# Patient Record
Sex: FEMALE | Race: OTHER | HISPANIC OR LATINO | Employment: UNEMPLOYED | URBAN - METROPOLITAN AREA
[De-identification: names, ages, dates, MRNs, and addresses within clinical notes are randomized per-mention and may not be internally consistent; named-entity substitution may affect disease eponyms.]

---

## 2022-01-05 ENCOUNTER — GYNECOLOGY VISIT (OUTPATIENT)
Dept: OBGYN | Facility: CLINIC | Age: 22
End: 2022-01-05

## 2022-01-05 VITALS
BODY MASS INDEX: 27.08 KG/M2 | DIASTOLIC BLOOD PRESSURE: 68 MMHG | WEIGHT: 129 LBS | HEIGHT: 58 IN | SYSTOLIC BLOOD PRESSURE: 100 MMHG

## 2022-01-05 DIAGNOSIS — N93.8 DUB (DYSFUNCTIONAL UTERINE BLEEDING): ICD-10-CM

## 2022-01-05 PROCEDURE — 99203 OFFICE O/P NEW LOW 30 MIN: CPT | Mod: 25 | Performed by: ADVANCED PRACTICE MIDWIFE

## 2022-01-05 PROCEDURE — 76857 US EXAM PELVIC LIMITED: CPT | Performed by: ADVANCED PRACTICE MIDWIFE

## 2022-01-05 NOTE — PROGRESS NOTES
"Tisha Morton is a 21 y.o. female who presents for DUB        HPI Comments: Pt presents for DUB.  No LMP recorded. She reports that she was traveling from [facility] around first week of August and had approximately 3 days of bleeding which was different than her normal period. She believes prior to this she had a period in May of 2021.     Review of Systems   Pertinent positives documented in HPI and all other systems reviewed & are negative      History reviewed. No pertinent past medical history.    Medications:   No current [facility]-ordered outpatient medications on file.     No current [facility]-ordered facility-administered medications on file.          Objective:   Vital measurements:  /68 (BP Location: Left arm, Patient Position: Sitting, BP Cuff Size: Adult)   Ht 1.461 m (4' 9.5\")   Wt 58.5 kg (129 lb)   Body mass index is 27.43 kg/m². (Goal BM I>18 <25)    Physical Exam   Nursing note and vitals reviewed.  Constitutional: She is oriented to person, place, and time. She appears well-developed and well-nourished. No distress.     Genitourinary:  Uterus size of 32   No vaginal bleeding or discharge noted.     Musculoskeletal: Normal range of motion. She exhibits no edema and no tenderness.     Skin: Skin is warm and dry. No rash noted. She is not diaphoretic. No erythema. No pallor.     Psychiatric: She has a normal mood and affect. Her behavior is normal. Judgment and thought content normal.     Transabdominal Ultrasound  Indication: unsure LMP    Findings:  BPD 8.66 8.40 8.39   HC 30.12 32.25 31.78  AC 29.89 28.96 29.19  FL 6.44 6.25 6.07    FHR: 138 bpm  Grade II placenta  Fetus in vertex position    Impression: Single intrauterine pregnancy measuring 33 weeks and 5 days.     Per ACOG dating guidelines, final AMAN is 02/18/2022 based on US today due to significant discrepancy with LMP    Ultrasound performed and read by myself.              Assessment:     1. DUB (dysfunctional uterine bleeding)   "       Plan:   1. Discussed importance of prenatal vitamins with patient. Encouraged daily use.  2. Discussed advanced gestational age with patient and she voices understanding. Will get in ASAP for financial appointment.   3. Follow up in 1 weeks for NOB visit.

## 2022-01-05 NOTE — NON-PROVIDER
Patient here for GYN/DUB.  UPT=  LMP=8/15/21. approximated  AMAN=5/22/22  GA=20w3d  Last pap: n/a  Phone number: [phone]/ Baylee (friend) [phone]  Pharmacy verified  No problems today

## 2022-01-11 ENCOUNTER — APPOINTMENT (OUTPATIENT)
Dept: OBGYN | Facility: CLINIC | Age: 22
End: 2022-01-11
Payer: MEDICAID

## 2022-02-11 ENCOUNTER — HOSPITAL ENCOUNTER (INPATIENT)
Facility: MEDICAL CENTER | Age: 22
LOS: 2 days | End: 2022-02-13
Attending: OBSTETRICS & GYNECOLOGY | Admitting: OBSTETRICS & GYNECOLOGY
Payer: COMMERCIAL

## 2022-02-11 LAB
ABO GROUP BLD: NORMAL
AMPHET UR QL SCN: NEGATIVE
BARBITURATES UR QL SCN: NEGATIVE
BASOPHILS # BLD AUTO: 0.8 % (ref 0–1.8)
BASOPHILS # BLD: 0.07 K/UL (ref 0–0.12)
BENZODIAZ UR QL SCN: NEGATIVE
BLD GP AB SCN SERPL QL: NORMAL
BZE UR QL SCN: NEGATIVE
CANNABINOIDS UR QL SCN: NEGATIVE
EOSINOPHIL # BLD AUTO: 1.03 K/UL (ref 0–0.51)
EOSINOPHIL NFR BLD: 11.7 % (ref 0–6.9)
ERYTHROCYTE [DISTWIDTH] IN BLOOD BY AUTOMATED COUNT: 42.1 FL (ref 35.9–50)
HBV SURFACE AG SER QL: ABNORMAL
HCT VFR BLD AUTO: 36.7 % (ref 37–47)
HCV AB SER QL: ABNORMAL
HGB BLD-MCNC: 12.1 G/DL (ref 12–16)
HIV 1+2 AB+HIV1 P24 AG SERPL QL IA: NORMAL
IMM GRANULOCYTES # BLD AUTO: 0.04 K/UL (ref 0–0.11)
IMM GRANULOCYTES NFR BLD AUTO: 0.5 % (ref 0–0.9)
LYMPHOCYTES # BLD AUTO: 1.94 K/UL (ref 1–4.8)
LYMPHOCYTES NFR BLD: 21.9 % (ref 22–41)
MCH RBC QN AUTO: 28.3 PG (ref 27–33)
MCHC RBC AUTO-ENTMCNC: 33 G/DL (ref 33.6–35)
MCV RBC AUTO: 85.9 FL (ref 81.4–97.8)
METHADONE UR QL SCN: NEGATIVE
MONOCYTES # BLD AUTO: 0.56 K/UL (ref 0–0.85)
MONOCYTES NFR BLD AUTO: 6.3 % (ref 0–13.4)
NEUTROPHILS # BLD AUTO: 5.2 K/UL (ref 2–7.15)
NEUTROPHILS NFR BLD: 58.8 % (ref 44–72)
NRBC # BLD AUTO: 0 K/UL
NRBC BLD-RTO: 0 /100 WBC
OPIATES UR QL SCN: NEGATIVE
OXYCODONE UR QL SCN: NEGATIVE
PCP UR QL SCN: NEGATIVE
PLATELET # BLD AUTO: 164 K/UL (ref 164–446)
PMV BLD AUTO: 12.3 FL (ref 9–12.9)
PROPOXYPH UR QL SCN: NEGATIVE
RBC # BLD AUTO: 4.27 M/UL (ref 4.2–5.4)
RH BLD: NORMAL
RUBV AB SER QL: 220 IU/ML
SARS-COV+SARS-COV-2 AG RESP QL IA.RAPID: NOTDETECTED
SPECIMEN SOURCE: NORMAL
TREPONEMA PALLIDUM IGG+IGM AB [PRESENCE] IN SERUM OR PLASMA BY IMMUNOASSAY: ABNORMAL
WBC # BLD AUTO: 8.8 K/UL (ref 4.8–10.8)

## 2022-02-11 PROCEDURE — 86850 RBC ANTIBODY SCREEN: CPT

## 2022-02-11 PROCEDURE — 59409 OBSTETRICAL CARE: CPT | Performed by: OBSTETRICS & GYNECOLOGY

## 2022-02-11 PROCEDURE — 86762 RUBELLA ANTIBODY: CPT

## 2022-02-11 PROCEDURE — 87591 N.GONORRHOEAE DNA AMP PROB: CPT

## 2022-02-11 PROCEDURE — 86780 TREPONEMA PALLIDUM: CPT

## 2022-02-11 PROCEDURE — 700111 HCHG RX REV CODE 636 W/ 250 OVERRIDE (IP): Performed by: OBSTETRICS & GYNECOLOGY

## 2022-02-11 PROCEDURE — 87340 HEPATITIS B SURFACE AG IA: CPT

## 2022-02-11 PROCEDURE — 770002 HCHG ROOM/CARE - OB PRIVATE (112)

## 2022-02-11 PROCEDURE — 86803 HEPATITIS C AB TEST: CPT

## 2022-02-11 PROCEDURE — 36415 COLL VENOUS BLD VENIPUNCTURE: CPT

## 2022-02-11 PROCEDURE — A9270 NON-COVERED ITEM OR SERVICE: HCPCS | Performed by: OBSTETRICS & GYNECOLOGY

## 2022-02-11 PROCEDURE — 302449 STATCHG TRIAGE ONLY (STATISTIC)

## 2022-02-11 PROCEDURE — 700102 HCHG RX REV CODE 250 W/ 637 OVERRIDE(OP): Performed by: OBSTETRICS & GYNECOLOGY

## 2022-02-11 PROCEDURE — 87426 SARSCOV CORONAVIRUS AG IA: CPT

## 2022-02-11 PROCEDURE — 85025 COMPLETE CBC W/AUTO DIFF WBC: CPT

## 2022-02-11 PROCEDURE — 86901 BLOOD TYPING SEROLOGIC RH(D): CPT

## 2022-02-11 PROCEDURE — 304965 HCHG RECOVERY SERVICES

## 2022-02-11 PROCEDURE — 87389 HIV-1 AG W/HIV-1&-2 AB AG IA: CPT

## 2022-02-11 PROCEDURE — 87491 CHLMYD TRACH DNA AMP PROBE: CPT

## 2022-02-11 PROCEDURE — 87086 URINE CULTURE/COLONY COUNT: CPT

## 2022-02-11 PROCEDURE — 59409 OBSTETRICAL CARE: CPT

## 2022-02-11 PROCEDURE — 80307 DRUG TEST PRSMV CHEM ANLYZR: CPT

## 2022-02-11 PROCEDURE — 86900 BLOOD TYPING SEROLOGIC ABO: CPT

## 2022-02-11 PROCEDURE — 86592 SYPHILIS TEST NON-TREP QUAL: CPT

## 2022-02-11 RX ORDER — SODIUM CHLORIDE, SODIUM LACTATE, POTASSIUM CHLORIDE, CALCIUM CHLORIDE 600; 310; 30; 20 MG/100ML; MG/100ML; MG/100ML; MG/100ML
INJECTION, SOLUTION INTRAVENOUS PRN
Status: DISCONTINUED | OUTPATIENT
Start: 2022-02-11 | End: 2022-02-13 | Stop reason: HOSPADM

## 2022-02-11 RX ORDER — ONDANSETRON 2 MG/ML
4 INJECTION INTRAMUSCULAR; INTRAVENOUS EVERY 6 HOURS PRN
Status: DISCONTINUED | OUTPATIENT
Start: 2022-02-11 | End: 2022-02-11 | Stop reason: HOSPADM

## 2022-02-11 RX ORDER — SODIUM CHLORIDE, SODIUM LACTATE, POTASSIUM CHLORIDE, CALCIUM CHLORIDE 600; 310; 30; 20 MG/100ML; MG/100ML; MG/100ML; MG/100ML
INJECTION, SOLUTION INTRAVENOUS CONTINUOUS
Status: DISCONTINUED | OUTPATIENT
Start: 2022-02-11 | End: 2022-02-13 | Stop reason: ALTCHOICE

## 2022-02-11 RX ORDER — TERBUTALINE SULFATE 1 MG/ML
0.25 INJECTION, SOLUTION SUBCUTANEOUS
Status: DISCONTINUED | OUTPATIENT
Start: 2022-02-11 | End: 2022-02-11 | Stop reason: HOSPADM

## 2022-02-11 RX ORDER — OXYTOCIN 10 [USP'U]/ML
10 INJECTION, SOLUTION INTRAMUSCULAR; INTRAVENOUS
Status: DISCONTINUED | OUTPATIENT
Start: 2022-02-11 | End: 2022-02-11 | Stop reason: HOSPADM

## 2022-02-11 RX ORDER — ACETAMINOPHEN 500 MG
1000 TABLET ORAL
Status: DISCONTINUED | OUTPATIENT
Start: 2022-02-11 | End: 2022-02-11 | Stop reason: HOSPADM

## 2022-02-11 RX ORDER — IBUPROFEN 800 MG/1
800 TABLET ORAL EVERY 8 HOURS PRN
Status: DISCONTINUED | OUTPATIENT
Start: 2022-02-11 | End: 2022-02-13 | Stop reason: HOSPADM

## 2022-02-11 RX ORDER — ONDANSETRON 4 MG/1
4 TABLET, ORALLY DISINTEGRATING ORAL EVERY 6 HOURS PRN
Status: DISCONTINUED | OUTPATIENT
Start: 2022-02-11 | End: 2022-02-11 | Stop reason: HOSPADM

## 2022-02-11 RX ORDER — ACETAMINOPHEN 500 MG
1000 TABLET ORAL EVERY 6 HOURS PRN
Status: DISCONTINUED | OUTPATIENT
Start: 2022-02-11 | End: 2022-02-13 | Stop reason: HOSPADM

## 2022-02-11 RX ORDER — DOCUSATE SODIUM 100 MG/1
100 CAPSULE, LIQUID FILLED ORAL 2 TIMES DAILY PRN
Status: DISCONTINUED | OUTPATIENT
Start: 2022-02-11 | End: 2022-02-13 | Stop reason: HOSPADM

## 2022-02-11 RX ORDER — MISOPROSTOL 200 UG/1
800 TABLET ORAL
Status: DISCONTINUED | OUTPATIENT
Start: 2022-02-11 | End: 2022-02-11 | Stop reason: HOSPADM

## 2022-02-11 RX ORDER — IBUPROFEN 800 MG/1
800 TABLET ORAL
Status: COMPLETED | OUTPATIENT
Start: 2022-02-11 | End: 2022-02-11

## 2022-02-11 RX ADMIN — OXYTOCIN 2000 ML/HR: 10 INJECTION, SOLUTION INTRAMUSCULAR; INTRAVENOUS at 17:30

## 2022-02-11 RX ADMIN — OXYTOCIN 125 ML/HR: 10 INJECTION, SOLUTION INTRAMUSCULAR; INTRAVENOUS at 18:57

## 2022-02-11 RX ADMIN — IBUPROFEN 800 MG: 800 TABLET, FILM COATED ORAL at 18:26

## 2022-02-11 ASSESSMENT — LIFESTYLE VARIABLES
ALCOHOL_USE: NO
EVER_SMOKED: NEVER

## 2022-02-11 ASSESSMENT — PATIENT HEALTH QUESTIONNAIRE - PHQ9
SUM OF ALL RESPONSES TO PHQ9 QUESTIONS 1 AND 2: 0
1. LITTLE INTEREST OR PLEASURE IN DOING THINGS: NOT AT ALL
2. FEELING DOWN, DEPRESSED, IRRITABLE, OR HOPELESS: NOT AT ALL

## 2022-02-12 ENCOUNTER — PHARMACY VISIT (OUTPATIENT)
Dept: PHARMACY | Facility: MEDICAL CENTER | Age: 22
End: 2022-02-12
Payer: MEDICARE

## 2022-02-12 LAB
ERYTHROCYTE [DISTWIDTH] IN BLOOD BY AUTOMATED COUNT: 42.6 FL (ref 35.9–50)
HCT VFR BLD AUTO: 34.5 % (ref 37–47)
HGB BLD-MCNC: 11.3 G/DL (ref 12–16)
MCH RBC QN AUTO: 28.5 PG (ref 27–33)
MCHC RBC AUTO-ENTMCNC: 32.8 G/DL (ref 33.6–35)
MCV RBC AUTO: 86.9 FL (ref 81.4–97.8)
PLATELET # BLD AUTO: 155 K/UL (ref 164–446)
PMV BLD AUTO: 11.5 FL (ref 9–12.9)
RBC # BLD AUTO: 3.97 M/UL (ref 4.2–5.4)
WBC # BLD AUTO: 10.9 K/UL (ref 4.8–10.8)

## 2022-02-12 PROCEDURE — 85027 COMPLETE CBC AUTOMATED: CPT

## 2022-02-12 PROCEDURE — 770002 HCHG ROOM/CARE - OB PRIVATE (112)

## 2022-02-12 PROCEDURE — RXMED WILLOW AMBULATORY MEDICATION CHARGE

## 2022-02-12 PROCEDURE — 36415 COLL VENOUS BLD VENIPUNCTURE: CPT

## 2022-02-12 RX ORDER — ACETAMINOPHEN 500 MG
1000 TABLET ORAL EVERY 6 HOURS PRN
Qty: 30 TABLET | Refills: 0 | Status: SHIPPED | OUTPATIENT
Start: 2022-02-12

## 2022-02-12 RX ORDER — IBUPROFEN 800 MG/1
800 TABLET ORAL EVERY 8 HOURS PRN
Qty: 30 TABLET | Refills: 0 | Status: SHIPPED | OUTPATIENT
Start: 2022-02-12

## 2022-02-12 RX ORDER — PSEUDOEPHEDRINE HCL 30 MG
100 TABLET ORAL 2 TIMES DAILY PRN
Qty: 60 CAPSULE | Refills: 0 | Status: SHIPPED | OUTPATIENT
Start: 2022-02-12

## 2022-02-12 NOTE — DISCHARGE SUMMARY
Discharge Summary:     Date of Admission: 2022  Date of Discharge: 22      Admitting diagnosis:    1. Pregnancy @ 39w0d      Discharge Diagnosis:   1. Status post vaginal, spontaneous.    No past medical history on file.  OB History    Para Term  AB Living   2 2 2     2   SAB IAB Ectopic Molar Multiple Live Births           0 2      # Outcome Date GA Lbr Bert/2nd Weight Sex Delivery Anes PTL Lv   2 Term 22 39w0d 05:26 / 00:01 2.815 kg (6 lb 3.3 oz) F Vag-Spont None N RENE   1 Term 16 40w0d  3.402 kg (7 lb 8 oz) M Vag-Spont  N RENE     No past surgical history on file.  Patient has no known allergies.    Patient Active Problem List   Diagnosis   • Indication for care in labor or delivery       Hospital Course:   Pt is a 21 y.o. now  who presented in active labor at 6 cm. She progressed quickly through labor and delivered within an hour of arrival. Her delivery was uncomplicated. Breast feeding is going well. Communication is difficult as they are from [facility] and speak a regional dialect. Her  speaks some American and is the . It is unclear how much is lost in translation as sometimes his answer differs to the same questions. Pain is well controlled. Bleeding minimal. They would love to leave the hospital ASAP if possible.    Socially:  Pt's moved here 4 months ago, unclear how much social support they have in the area, but they state they have no supplies for baby. SW consult placed and pending. Their American is limited and they speak a regional dialect of [facility].       Physical Exam:  Temp:  [36.3 °C (97.4 °F)-37 °C (98.6 °F)] 36.7 °C (98 °F)  Pulse:  [67-88] 67  Resp:  [17-18] 18  BP: (103-143)/(63-92) 105/67  SpO2:  [95 %-96 %] 95 %  Physical Exam  General: well and resting  Chest/Breasts: nipples intact and breasts soft   Abdomen: nontender, soft, non-distended  Fundus: firm, below umbilicus and nontender  Incision: not applicable, (vaginal  delivery)  Perineum: deferred  Extremities: symmetric and no edema, calves nontender    Current Facility-Administered Medications   Medication Dose   • LR infusion     • oxytocin (PITOCIN) infusion (for induction)  2 federico-units/min   • oxytocin (PITOCIN) infusion (for post delivery)  125 mL/hr   • lactated ringers infusion     • PRN oxytocin (PITOCIN) (20 Units/1000 mL) PRN for excessive uterine bleeding - See Admin Instr  125-999 mL/hr   • docusate sodium (COLACE) capsule 100 mg  100 mg   • tetanus-dipth-acell pertussis (Tdap) inj 0.5 mL  0.5 mL   • measles, mumps and rubella vaccine (MMR) injection 0.5 mL  0.5 mL   • ibuprofen (MOTRIN) tablet 800 mg  800 mg   • acetaminophen (TYLENOL) tablet 1,000 mg  1,000 mg   • influenza vaccine quad (FLUZONE/FLUARIX) injection 0.5 mL  0.5 mL       Recent Labs     02/11/22  1700   WBC 8.8   RBC 4.27   HEMOGLOBIN 12.1   HEMATOCRIT 36.7*   MCV 85.9   MCH 28.3   MCHC 33.0*   RDW 42.1   PLATELETCT 164   MPV 12.3         Activity/ Discharge Instructions::   Discharge to home  Pelvic Rest x 6 weeks  No heavy lifting x4 weeks  Call or come to ED for: heavy vaginal bleeding, fever >100.4, severe abdominal pain, severe headache, chest pain, shortness of breath,  N/V, incisional drainage, or other concerns.      Pain control:  Tylenol 500-1000 mg every six hours  Ibuprofen 800 mg every eight hours       Follow up:  [facility] in 5 weeks for vaginal delivery    Discharge Meds:   No current outpatient medications on file.       Robert Brasher M.D.

## 2022-02-12 NOTE — DISCHARGE PLANNING
Discharge Planning Assessment Post Partum    Reason for Referral: Walk-in, no prenatal care, and no supplies for infant  Address: [facility] [place] [address]  Phone: [phone]  Type of Living Situation: living with FOB  Mom Diagnosis: Pregnancy  Baby Diagnosis: [place]-39 weeks  Primary Language: Tamazight speaking only.  The  ipad was used (Valdo #[id number])    Name of Baby: Funmi Bose Tut (: 22)  Father of the Baby: Lamin Bose  Involved in baby’s care? Yes  Contact Information: [phone]    Prenatal Care: No-immigrated from [facility] four months ago  Mom's PCP: No  PCP for new baby: Pediatrician list provided to parents    Support System: FOB and family friend: Jesi Ryan ([phone])  Coping/Bonding between mother & baby: Yes  Source of Feeding: breast feeding  Supplies for Infant: FOB stated they thought that they would have another week before baby was born and that is why they don't have supplies.  Explained to parents that they will need a car seat, clothes, diapers, and crib or bassinet for infant.  FOB stated their friend-Jesi is able to help them get supplies.  SW provided parents with a car seat, 2 onsies, 2 hats, and booties.  Parents were given resources for additional baby supplies    Mom's Insurance: [facility]   Baby Covered on Insurance:Yes  Mother Employed/School: Not currently  Other children in the home/names & ages: son-age 5 years    Financial Hardship/Income: limited income and resources    Mom's Mental status: alert and oriented  Services used prior to admit: Medicaid    CPS History: No  Psychiatric History: No  Domestic Violence History: No  Drug/ETOH History: No, MOB's UDS is negative and infant is currently bagged    Resources Provided: pediatrician list, children and family resource list, list of WIC clinics, information to the [facility], and [facility] for additional baby supplies  Referrals Made: diaper bank referral  provided     Clearance for Discharge: Infant is cleared to discharge home with parents once medically cleared

## 2022-02-12 NOTE — PROGRESS NOTES
Late Entry    1630-Pt presents to L&D with c/o [facility].  Pt needs an , is from [facility].  Ipad used, but some barrier still noted.  SVE=6/100/-2.  Dr. Love made aware of pt's arrival and exam.  Orders to admit pt for labor and labs.  1725-In room, pt bearing down, SVE-pt , BBOW.  Ivan requested to come, 1726, Ivan in room, head is out, pt encouraged to push.   1727-Delivery of viable female, Dr. Love able to take over delivery, 8/9 apgars.   1731-Placenta delivered, oxytocin running.   used after delivery as well, FOB at BS.  1835-Pt up to BR, voided, pericare, gown changed, dinner served.  1845-BS report given to Winnie MONET-pt care assumed

## 2022-02-12 NOTE — CARE PLAN
The patient is Stable - Low risk of patient condition declining or worsening         Progress made toward(s) clinical / shift goals:  Monitor bleeding for indication of postpartum hemorrhage.     Patient is not progressing towards the following goals: N/A.       Problem: Knowledge Deficit - L&D  Goal: Patient and family/caregivers will demonstrate understanding of plan of care, disease process/condition, diagnostic tests and medications  Outcome: Progressing   Problem: Psychosocial - L&D  POC discussed with pt and FOB. Both pt and FOB verbalized understanding.   Goal: Patient's level of anxiety will decrease  Outcome: Progressing   Encouraged pt to express feelings and concerns.   Problem: Risk for Venous Thromboembolism (VTE)  Goal: VTE prevention measures will be implemented and patient will remain free from VTE  Outcome: Progressing   Pt ambulatory to self.

## 2022-02-12 NOTE — PROGRESS NOTES
Assessment done. Pt. Has no c/o pain.Fundus firm.Lochia light. Fob at bedside,supportive.Pt. Caring for baby with good skill. Breastfeeding going well.latch observed.

## 2022-02-12 NOTE — PROGRESS NOTES
1900- Received report from CHIKI Stevens. POC discussed.     1935- Pt tx up to postpartum room 315. Report given to CHIKI Sheridan. POC discussed. VSS. Light lochia rubra noted with a firm fundus one fingerbreadth below umbilicus. Bands verified.

## 2022-02-12 NOTE — L&D DELIVERY NOTE
VAGINAL DELIVERY NOTE    The patient presented at 39w0d in active labor at 6cm and progressed to complete shortly after admission to labor room.  I was called to the room for delivery and RN was already instructing patient to push and baby was delivered by the time I donned gloves.  LV female infant delivered to belly and cord clamped and cut.  Apgars 8/9 and wt 2815g.  Gases were not sent. The placenta did follow spontaneously. The uterus was not explored.  Small superior/periurethral laceration, first degree, hemostatic and without need for repair. No perineal lacerations.  EBL 50mL    Linda Love D.O.  February 11, 2022

## 2022-02-12 NOTE — H&P
Obstetrics Admission History and Physical      History of Present Illness  Patient is a 21 y.o.  at 39w0d who presents for contractions and was found to be 6cm and admitted directly to a labor room without triage.  She states the baby is moving well.  She has had no prenatal care this pregnancy other than DUB appt  where she had a US for dating (at 33.5wks).  Patient is from [facility] and speaks an indiginous dialect therefore even with Tongan interpretor there is some language barrier.   speaks more [place] citizen than patient.  She reports her first delivery was in [facility] 6 years ago, home birth. Denies complications. They immigrated to [place] about 4-5 months ago.  They have no family here and have no supplies or anything for baby.    Pregnancy dated by: 33.5wk US    Pregnancy complications/antepartum admissions:   Patient Active Problem List   Diagnosis   • Indication for care in labor or delivery          OB History    Para Term  AB Living   2 1 1     1   SAB IAB Ectopic Molar Multiple Live Births             1      # Outcome Date GA Lbr Bert/2nd Weight Sex Delivery Anes PTL Lv   2 Current            1 Term 16 40w0d  3.402 kg (7 lb 8 oz) M Vag-Spont  N RENE       No past medical history on file.    No current facility-administered medications on file prior to encounter.     No current outpatient medications on file prior to encounter.       No Known Allergies    No family history on file.    No past surgical history on file.    Social History  Social History     Tobacco Use   • Smoking status: Never Smoker   • Smokeless tobacco: Never Used   Vaping Use   • Vaping Use: Never used   Substance Use Topics   • Alcohol use: Not on file   • Drug use: Not on file       Review of Systems   General: Fever: Negative  HEENT: Sore Throat: Negative  CV: Chest Pain: Negative  Repiratory: Shortness of Breath: Negative  GI: Abdominal pain: Negative  : Dysuria: Negative    Physical  Examination  There were no vitals filed for this visit.  Appearance/Psychiatric: She does not appear anxious.  Constitutional: The patient is well nourished.  Neck: Neck appears symmetric.  Cardiovascular: No peripheral edema.  Respiratory: Respirations unlabored  Gastrointestinal: Soft, non-tender, gravid.  Extremeties: Legs are symmetric and without tenderness.   Skin: No rash observed.    Pelvic: SVE: 6/100/BBOW per RN      Assessment/Plan:   21 y.o.  at 39w0d in active labor   - admitted to labor room with standard orders   - PNL ordered   - FHT reassuring, Cat I    2. SW consult            Linda Love D.O.

## 2022-02-12 NOTE — DISCHARGE PLANNING
Meds-to-Beds: Discharge prescription orders listed below delivered to patient's bedside. RN eKrrie notified. Patient declines .     Current Outpatient Medications   Medication Sig Dispense Refill   • acetaminophen (TYLENOL) 500 MG Tab Take 2 Tablets by mouth every 6 hours as needed. 30 Tablet 0   • ibuprofen (MOTRIN) 800 MG Tab Take 1 Tablet by mouth every 8 hours as needed. 30 Tablet 0   • docusate sodium 100 MG Cap Take 1 capsule by mouth 2 times a day as needed for Constipation. 60 Capsule 0      Roseann Toussaint, PharmD

## 2022-02-13 VITALS
OXYGEN SATURATION: 99 % | HEART RATE: 87 BPM | SYSTOLIC BLOOD PRESSURE: 126 MMHG | RESPIRATION RATE: 16 BRPM | BODY MASS INDEX: 28.71 KG/M2 | WEIGHT: 135 LBS | TEMPERATURE: 97.6 F | DIASTOLIC BLOOD PRESSURE: 80 MMHG

## 2022-02-13 LAB
BACTERIA UR CULT: NORMAL
C TRACH DNA SPEC QL NAA+PROBE: NEGATIVE
N GONORRHOEA DNA SPEC QL NAA+PROBE: NEGATIVE
SIGNIFICANT IND 70042: NORMAL
SITE SITE: NORMAL
SOURCE SOURCE: NORMAL
SPECIMEN SOURCE: NORMAL

## 2022-02-13 PROCEDURE — 3E0234Z INTRODUCTION OF SERUM, TOXOID AND VACCINE INTO MUSCLE, PERCUTANEOUS APPROACH: ICD-10-PCS | Performed by: OBSTETRICS & GYNECOLOGY

## 2022-02-13 PROCEDURE — 90471 IMMUNIZATION ADMIN: CPT

## 2022-02-13 PROCEDURE — 90715 TDAP VACCINE 7 YRS/> IM: CPT | Performed by: OBSTETRICS & GYNECOLOGY

## 2022-02-13 PROCEDURE — 700111 HCHG RX REV CODE 636 W/ 250 OVERRIDE (IP): Performed by: OBSTETRICS & GYNECOLOGY

## 2022-02-13 RX ADMIN — TETANUS TOXOID, REDUCED DIPHTHERIA TOXOID AND ACELLULAR PERTUSSIS VACCINE, ADSORBED 0.5 ML: 5; 2.5; 8; 8; 2.5 SUSPENSION INTRAMUSCULAR at 08:04

## 2022-02-13 ASSESSMENT — EDINBURGH POSTNATAL DEPRESSION SCALE (EPDS)
THE THOUGHT OF HARMING MYSELF HAS OCCURRED TO ME: NEVER
I HAVE BEEN SO UNHAPPY THAT I HAVE HAD DIFFICULTY SLEEPING: YES, SOMETIMES
I HAVE BEEN ABLE TO LAUGH AND SEE THE FUNNY SIDE OF THINGS: NOT QUITE SO MUCH NOW
I HAVE BEEN ANXIOUS OR WORRIED FOR NO GOOD REASON: HARDLY EVER
I HAVE BEEN SO UNHAPPY THAT I HAVE BEEN CRYING: NO, NEVER
I HAVE LOOKED FORWARD WITH ENJOYMENT TO THINGS: RATHER LESS THAN I USED TO
THINGS HAVE BEEN GETTING ON TOP OF ME: NO, MOST OF THE TIME I HAVE COPED QUITE WELL
I HAVE FELT SCARED OR PANICKY FOR NO GOOD REASON: NO, NOT MUCH
I HAVE BLAMED MYSELF UNNECESSARILY WHEN THINGS WENT WRONG: YES, SOME OF THE TIME
I HAVE FELT SAD OR MISERABLE: NO, NOT AT ALL

## 2022-02-13 NOTE — LACTATION NOTE
This note was copied from a baby's chart.  IPAD  used: Uvaldo #[id number]    Reviewed with parents the basic breastfeeding recommendations and reminded to make sure baby breastfeeds at least 8-10 times every 24 hours and that babies should have increasing voids and stools with each day of life.Both parents voice understanding.    Baby currently breastfeeding and good latch and sucking observed.  Mom very comfortable and handling baby well.

## 2022-02-13 NOTE — PROGRESS NOTES
Tdap vaccine is not scanning correctly. Call placed to pharmacy, spoke with Zoraida who stated it is ok to give the pt this vaccine, just override it. RX Message sent with bar code per Zoraida's instructions. Tdap vaccine will be given.

## 2022-02-13 NOTE — PROGRESS NOTES
Report received from Kerrie MONET. Pt assessment complete. Reviewed 0-10 pain scale and available pain medication. Pt states pain at a 2 and tolerable. Pt will call for pain medication as needed. Pt is breastfeeding baby and latching her independently. Call light is within reach. Advised pt to call for any needs.

## 2022-02-13 NOTE — DISCHARGE SUMMARY
Discharge Summary:     Date of Admission: 2022  Date of Discharge: 22      Admitting diagnosis:    1. Pregnancy @ 39w0d      Discharge Diagnosis:   1. Status post vaginal, spontaneous.    No past medical history on file.  OB History    Para Term  AB Living   2 2 2     2   SAB IAB Ectopic Molar Multiple Live Births           0 2      # Outcome Date GA Lbr Bert/2nd Weight Sex Delivery Anes PTL Lv   2 Term 22 39w0d 05:26 / 00:01 2.815 kg (6 lb 3.3 oz) F Vag-Spont None N RENE   1 Term 16 40w0d  3.402 kg (7 lb 8 oz) M Vag-Spont  N RENE     No past surgical history on file.  Patient has no known allergies.    Patient Active Problem List   Diagnosis   • Indication for care in labor or delivery       Hospital Course:      Mom's discharge was cancelled yesterday 2/2 pediatric concerns. She is still doing well and stable for discharge.    Pt is a 21 y.o. now  who presented in active labor at 6 cm. She progressed quickly through labor and delivered within an hour of arrival. Her delivery was uncomplicated. Breast feeding is going well. Communication is difficult as they are from [facility] and speak a regional dialect. Her  speaks some Frisian and is the . It is unclear how much is lost in translation as sometimes his answer differs to the same questions. Pain is well controlled. Bleeding minimal. They would love to leave the hospital ASAP if possible.     Socially:  Pt's moved here 4 months ago, unclear how much social support they have in the area, but they state they have no supplies for baby. SW consult placed and pending. Their Frisian is limited and they speak a regional dialect of [facility].       Physical Exam:  Temp:  [36.7 °C (98 °F)-36.8 °C (98.3 °F)] 36.7 °C (98 °F)  Pulse:  [62-73] 62  Resp:  [16] 16  BP: (103-117)/(58-76) 117/76  SpO2:  [95 %-96 %] 96 %  Physical Exam  General: well and resting  Chest/Breasts: nipples intact and breasts soft   Abdomen:  soft, non-distended  Fundus: firm, below umbilicus and nontender  Incision: not applicable, (vaginal delivery)  Perineum: deferred  Extremities: symmetric and no edema, calves nontender    Current Facility-Administered Medications   Medication Dose   • LR infusion     • oxytocin (PITOCIN) infusion (for induction)  2 federico-units/min   • oxytocin (PITOCIN) infusion (for post delivery)  125 mL/hr   • lactated ringers infusion     • PRN oxytocin (PITOCIN) (20 Units/1000 mL) PRN for excessive uterine bleeding - See Admin Instr  125-999 mL/hr   • docusate sodium (COLACE) capsule 100 mg  100 mg   • tetanus-dipth-acell pertussis (Tdap) inj 0.5 mL  0.5 mL   • measles, mumps and rubella vaccine (MMR) injection 0.5 mL  0.5 mL   • ibuprofen (MOTRIN) tablet 800 mg  800 mg   • acetaminophen (TYLENOL) tablet 1,000 mg  1,000 mg   • influenza vaccine quad (FLUZONE/FLUARIX) injection 0.5 mL  0.5 mL       Recent Labs     02/11/22  1700 02/12/22  0652   WBC 8.8 10.9*   RBC 4.27 3.97*   HEMOGLOBIN 12.1 11.3*   HEMATOCRIT 36.7* 34.5*   MCV 85.9 86.9   MCH 28.3 28.5   MCHC 33.0* 32.8*   RDW 42.1 42.6   PLATELETCT 164 155*   MPV 12.3 11.5         Activity/ Discharge Instructions::   Discharge to home  Pelvic Rest x 6 weeks  No heavy lifting x4 weeks  Call or come to ED for: heavy vaginal bleeding, fever >100.4, severe abdominal pain, severe headache, chest pain, shortness of breath,  N/V, incisional drainage, or other concerns.       Follow up:  [facility] in 5 weeks for vaginal delivery    Discharge Meds:   Current Outpatient Medications   Medication Sig Dispense Refill   • acetaminophen (TYLENOL) 500 MG Tab Take 2 Tablets by mouth every 6 hours as needed. 30 Tablet 0   • ibuprofen (MOTRIN) 800 MG Tab Take 1 Tablet by mouth every 8 hours as needed. 30 Tablet 0   • docusate sodium 100 MG Cap Take 1 capsule by mouth 2 times a day as needed for Constipation. 60 Capsule 0       Robert Brasher M.D.

## 2022-02-13 NOTE — LACTATION NOTE
This note was copied from a baby's chart.  Ipad  used:  Cortney #[id number]    Breastfeeding basic information provided via .  Mom denies concerns and states that she  previous child without complications.  MOB denies nipple pain with breastfeeding.  Recommended skin to skin with baby as much as possible while mom is awake to become aware of feeding cues and to promote feeding interest.  Education provided to breastfeed frequently, with cues and at least 8-10 times every 24 hours.  Both parents voice understanding.  Instructed to call RN if in need of any assistance.

## 2022-02-14 NOTE — PROGRESS NOTES
Pt has discharge orders to go home tonight. Reviewed and discussed discharge orders and education handout with pt and , Lamin Dukes. Pt gave verbal understanding and asked questions which were answered. Pt signed discharge papers. Pt is waiting for her ride to come pick her up.  2036: pt taken down to car in a wheelchair with baby in arms.

## 2022-02-14 NOTE — PROGRESS NOTES
0700: Bedside report completed with SIDRA Dumont RN and assumed care of pt. Bed locked and in lowest position with personal belongings and call light within reach. Pt reports all needs met at this time.     0730: 12 hour chart check completed. Orders/MAR reviewed.

## 2022-02-14 NOTE — DISCHARGE INSTRUCTIONS
PATIENT DISCHARGE EDUCATION INSTRUCTION SHEET  REASONS TO CALL YOUR OBSTETRICIAN  · Persistent fever, shaking, chills (Temperature higher than 100.4) may indicate you have an infection  · Heavy bleeding: soaking more than 1 pad per hour; Passing clots an egg-sized clot or bigger may mean you have an postpartum hemorrhage  · Foul odor from vagina or bad smelling or discolored discharge or blood  · Breast infection (Mastitis symptoms); breast pain, chills, fever, redness or red streaks, may feel flu like symptoms  · Urinary pain, burning or frequency  · Incision that is not healing, increased redness, swelling, tenderness or pain, or any pus from episiotomy or  site may mean you have an infection  · Redness, swelling, warmth, or painful to touch in the calf area of your leg may mean you have a blood clot  · Severe or intensified depression, thoughts or feelings of wanting to hurt yourself or someone else   · Pain in chest, obstructed breathing or shortness of breath (trouble catching your breath) may mean you are having a postpartum complication. Call your provider immediately   · Headache that does not get better, even after taking medicine, a bad headache with vision changes or pain in the upper right area of your belly may mean you have high blood pressure or post birth preeclampsia. Call your provider immediately    HAND WASHING  All family and friends should wash their hands:  · Before and after holding the baby  · Before feeding the baby  · After using the restroom or changing the baby's diaper    WOUND CARE  Ask your physician for additional care instructions. In general:  · Episiotomy/Laceration  · May use brissa-spray bottle, witch hazel pads and dermaplast spray for comfort  · Use brissa-spray bottle after urinating to cleanse perineal area  · To prevent burning during urination spray brissa-water bottle on labial area   · Pat perineal area dry until episiotomy/laceration is healed  · Continue to use  brissa-bottle until bleeding stops as needed  · If have a 2nd degree laceration or greater, a Sitz bath can offer relief from soreness, burning, and inflammation   · Sitz Bath   · Sit in 6 inches of warm water and soak laceration as needed until the laceration heals    VAGINAL CARE AND BLEEDING  · Nothing inside vagina for 6 weeks:   · No sexual intercourse, tampons or douching  · Bleeding may continue for 2-4 weeks. Amount and color may vary  · Soaking 1 pad or more in an hour for several hours is considered heavy bleeding  · Passing large egg sized blood clots can be concerning  · If you feel like you have heavy bleeding or are having increasing amount of blood clots call your Obstetrician immediately  · If you begin feeling faint upon standing, feeling sick to your stomach, have clammy skin, a really fast heartbeat, have chills, start feeling confused, dizzy, sleepy or weak, or feeling like you're going to faint call your Obstetrician immediately    HYPERTENSION   Preeclampsia or gestational hypertension are types of high blood pressure that only pregnant women can get. It is important for you to be aware of symptoms to seek early intervention and treatment. If you have any of these symptoms immediately call your Obstetrician    · Vision changes or blurred vision   · Severe headache or pain that is unrelieved with medication and will not go away  · Persistent pain in upper abdomen or shoulder   · Increased swelling of face, feet, or hands  · Difficulty breathing or shortness of breath at rest  · Urinating less than usual    URINATION AND BOWEL MOVEMENTS  · Eating more fiber (bran cereal, fruits, and vegetables) and drinking plenty of fluids will help to avoid constipation  · Urinary frequency and urgency after childbirth is normal  · If you experience any urinary pain, burning or frequency call your provider    BIRTH CONTROL  · It is possible to become pregnant at any time after delivery and while  "breastfeeding  · Plan to discuss a method of birth control with your physician at your post delivery follow up visit    POSTPARTUM BLUES  During the first few days after birth, you may experience a sense of the \"blues\" which may include impatience, irritability or even crying. These feelings come and go quickly. However, as many as 1 in 10 women experience emotional symptoms known as postpartum depression.     POSTPARTUM DEPRESSION  May start as early as the second or third day after delivery or take several weeks or months to develop. Symptoms of \"blues\" are present, but are more intense: Crying spells; loss of appetite; feelings of hopelessness or loss of control; fear of touching the baby; over concern or no concern at all about the baby; little or no concern about your own appearance/caring for yourself; and/or inability to sleep or excessive sleeping. Contact your Obstetrician if you are experiencing any of these symptoms     PREVENTING SHAKEN BABY  If you are angry or stressed, PUT THE BABY IN THE CRIB, step away, take some deep breaths, and wait until you are calm to care for the baby. DO NOT SHAKE THE BABY. You are not alone, call a supporter for help.  · [facility] 24/7 crisis call line (807-320-6794) or (1-885.522.3542)  · You can also text them, text \"ANSWER\" (970904)      "

## 2022-03-14 ENCOUNTER — POST PARTUM (OUTPATIENT)
Dept: OBGYN | Facility: CLINIC | Age: 22
End: 2022-03-14

## 2022-03-14 VITALS — SYSTOLIC BLOOD PRESSURE: 100 MMHG | WEIGHT: 119 LBS | BODY MASS INDEX: 25.31 KG/M2 | DIASTOLIC BLOOD PRESSURE: 60 MMHG

## 2022-03-14 PROCEDURE — 96372 THER/PROPH/DIAG INJ SC/IM: CPT | Performed by: NURSE PRACTITIONER

## 2022-03-14 PROCEDURE — 0503F POSTPARTUM CARE VISIT: CPT | Performed by: NURSE PRACTITIONER

## 2022-03-14 RX ORDER — MEDROXYPROGESTERONE ACETATE 150 MG/ML
150 INJECTION, SUSPENSION INTRAMUSCULAR ONCE
Status: COMPLETED | OUTPATIENT
Start: 2022-03-14 | End: 2022-03-14

## 2022-03-14 RX ADMIN — MEDROXYPROGESTERONE ACETATE 150 MG: 150 INJECTION, SUSPENSION INTRAMUSCULAR at 13:32

## 2022-03-14 ASSESSMENT — ENCOUNTER SYMPTOMS
MUSCULOSKELETAL NEGATIVE: 1
CARDIOVASCULAR NEGATIVE: 1
EYES NEGATIVE: 1
PSYCHIATRIC NEGATIVE: 1
GASTROINTESTINAL NEGATIVE: 1
CONSTITUTIONAL NEGATIVE: 1
RESPIRATORY NEGATIVE: 1
NEUROLOGICAL NEGATIVE: 1

## 2022-03-14 NOTE — PROGRESS NOTES
Subjective     Tisha Morton is a 21 y.o.  female who presents for her postpartum exam. She had a  without complication. Her prenatal course was complicated by no prenatal care. Eating a regular diet without difficulty. Bowel movement are Normal.  The patient is not having any pain. Vaginal bleeding: none. Patient Denies Incisional pain, drainage or redness.  She is breast feeding. She desires Depo provera for her birth control method. Reports no sex prior to this appointment.  Denies any S/S of PP depression.    Assessment   Assessment:    1. PP care of lactating women   2. Exam WNL   3. Pap deferred due to age  4. Desires contraception   5. EPDS score: 0    Patient Active Problem List    Diagnosis Date Noted   • Encounter for postpartum care of lactating mother 2022       Plan   Plan:    1. Breastfeeding support   2. Continue PNV   3. Contraceptive counseling - Depo inj today  4. Encouraged condom use for 1 wk  5. Discussed diet, exercise and resumption of sexual activity   6. Gave copy of pap   7.  F/u c PCP or [facility] as needed for primary care needs.     HPI    Review of Systems   Constitutional: Negative.    HENT: Negative.    Eyes: Negative.    Respiratory: Negative.    Cardiovascular: Negative.    Gastrointestinal: Negative.    Genitourinary: Negative.    Musculoskeletal: Negative.    Skin: Negative.    Neurological: Negative.    Endo/Heme/Allergies: Negative.    Psychiatric/Behavioral: Negative.               Objective     /60   Wt 54 kg (119 lb)   BMI 25.31 kg/m²      Physical Exam  Constitutional:       Appearance: She is well-developed.   Pulmonary:      Effort: Pulmonary effort is normal.   Abdominal:      Palpations: Abdomen is soft.   Genitourinary:     Exam position: Supine.      Labia:         Right: No rash, tenderness, lesion or injury.         Left: No rash, tenderness, lesion or injury.       Vagina: Normal. No signs of injury. No vaginal discharge,  erythema, tenderness or bleeding.      Rectum: No tenderness.   Skin:     General: Skin is warm and dry.   Neurological:      Mental Status: She is alert and oriented to person, place, and time.   Psychiatric:         Behavior: Behavior normal.         Thought Content: Thought content normal.         Judgment: Judgment normal.                             Assessment & Plan        1. Encounter for postpartum care of lactating mother    - medroxyPROGESTERone (DEPO-PROVERA) injection 150 mg

## 2022-03-14 NOTE — NON-PROVIDER
Pt here today for postpartum exam.  Delivery type: 2022 Vaginal   Currently : Breast feeding   Self pay: information for [facility] given to patient.  Desired BCM: Not sure   LMP: N/a  Last pap: N/a  Phone # [phone]    NDC: 9146-2607-22  LOT#: [id number]  Expiration Date: 2024  Dose: 150mg  Site: Left Deltoid  Patient educated on use and side effects of medication. Name and  verified prior to injection. Pt tolerated? Well   Verified by NERI  Administered by Suze Xiao, Med Ass't at 1:34 PM.  Patient Provided Medication: no

## 2022-09-02 ENCOUNTER — APPOINTMENT (OUTPATIENT)
Dept: OBGYN | Facility: CLINIC | Age: 22
End: 2022-09-02